# Patient Record
Sex: FEMALE | Race: OTHER | HISPANIC OR LATINO | Employment: UNEMPLOYED | URBAN - METROPOLITAN AREA
[De-identification: names, ages, dates, MRNs, and addresses within clinical notes are randomized per-mention and may not be internally consistent; named-entity substitution may affect disease eponyms.]

---

## 2022-01-24 ENCOUNTER — APPOINTMENT (OUTPATIENT)
Dept: URGENT CARE | Age: 32
End: 2022-01-24

## 2022-01-24 PROCEDURE — U0005 INFEC AGEN DETEC AMPLI PROBE: HCPCS

## 2022-01-24 PROCEDURE — U0003 INFECTIOUS AGENT DETECTION BY NUCLEIC ACID (DNA OR RNA); SEVERE ACUTE RESPIRATORY SYNDROME CORONAVIRUS 2 (SARS-COV-2) (CORONAVIRUS DISEASE [COVID-19]), AMPLIFIED PROBE TECHNIQUE, MAKING USE OF HIGH THROUGHPUT TECHNOLOGIES AS DESCRIBED BY CMS-2020-01-R: HCPCS

## 2022-01-25 ENCOUNTER — TELEPHONE (OUTPATIENT)
Dept: URGENT CARE | Age: 32
End: 2022-01-25

## 2022-01-25 NOTE — TELEPHONE ENCOUNTER
Patient was telephoned and told her results for her recent COVID testing, which she was negative for

## 2023-07-14 ENCOUNTER — TELEPHONE (OUTPATIENT)
Dept: GASTROENTEROLOGY | Facility: CLINIC | Age: 33
End: 2023-07-14

## 2023-07-14 ENCOUNTER — OFFICE VISIT (OUTPATIENT)
Dept: GASTROENTEROLOGY | Facility: CLINIC | Age: 33
End: 2023-07-14
Payer: COMMERCIAL

## 2023-07-14 VITALS
TEMPERATURE: 98.5 F | HEIGHT: 60 IN | WEIGHT: 222.6 LBS | SYSTOLIC BLOOD PRESSURE: 118 MMHG | DIASTOLIC BLOOD PRESSURE: 64 MMHG | BODY MASS INDEX: 43.7 KG/M2

## 2023-07-14 DIAGNOSIS — K21.9 GASTROESOPHAGEAL REFLUX DISEASE, UNSPECIFIED WHETHER ESOPHAGITIS PRESENT: ICD-10-CM

## 2023-07-14 DIAGNOSIS — R10.84 GENERALIZED ABDOMINAL PAIN: Primary | ICD-10-CM

## 2023-07-14 DIAGNOSIS — R68.81 EARLY SATIETY: ICD-10-CM

## 2023-07-14 DIAGNOSIS — R19.8 PAIN ASSOCIATED WITH DEFECATION: ICD-10-CM

## 2023-07-14 DIAGNOSIS — R19.5 ABNORMAL STOOLS: ICD-10-CM

## 2023-07-14 DIAGNOSIS — R14.0 BLOATING: ICD-10-CM

## 2023-07-14 PROCEDURE — 99244 OFF/OP CNSLTJ NEW/EST MOD 40: CPT | Performed by: INTERNAL MEDICINE

## 2023-07-14 RX ORDER — MONTELUKAST SODIUM 10 MG/1
10 TABLET ORAL
COMMUNITY
Start: 2022-09-15 | End: 2023-09-15

## 2023-07-14 RX ORDER — FLUTICASONE PROPIONATE 50 MCG
2 SPRAY, SUSPENSION (ML) NASAL DAILY
COMMUNITY
Start: 2023-02-01

## 2023-07-14 RX ORDER — BISACODYL 5 MG/1
20 TABLET, DELAYED RELEASE ORAL ONCE
Qty: 4 TABLET | Refills: 0 | Status: SHIPPED | OUTPATIENT
Start: 2023-07-14 | End: 2023-07-14

## 2023-07-14 RX ORDER — LEVOTHYROXINE SODIUM 0.15 MG/1
150 TABLET ORAL DAILY
COMMUNITY
Start: 2023-03-20

## 2023-07-14 NOTE — PROGRESS NOTES
[facility] - Outpatient Consultation  Marcie Castro 29 y.o. female MRN: [id number]  Encounter: [id number]          ASSESSMENT AND PLAN:      1. Generalized abdominal pain  2. Bloating  3. Heartburn  4. Early satiety  5. Abnormal stools  6. Pain on defecation  7. Marijuana use  8. History of bariatric surgery    Differentials include peptic ulcer disease, H. pylori, celiac disease, GI infection, gallstone disease, SIBO, IBS, IBD, pancreatic insufficiency, symptoms secondary to marijuana use and/or uncontrolled hypothyroidism. Discussed with patient about getting EGD, colonoscopy, CT abdomen, right upper quadrant ultrasound, stool test, CRP, celiac serology. Further management plan based on investigation results. Patient agreeable. Investigations ordered. RTC 4 months. Diane Lacey MD  Gastroenterology  [facility]  Date: July 14, 2023    - US right upper quadrant; Future  - CT abdomen pelvis w contrast; Future  - Calprotectin,Fecal; Future  - C-reactive protein; Future  - Giardia Ag, EIA, Stool (3 Specimens); Future  - Ova and parasite examination; Future  - Pancreatic elastase, fecal; Future  - Tissue transglutaminase, IgA; Future  - IgA; Future  - bisacodyl (DULCOLAX) 5 mg EC tablet; Take 4 tablets (20 mg total) by mouth once for 1 dose Colonoscopy prep  Dispense: 4 tablet; Refill: 0  - polyethylene glycol (GOLYTELY) 4000 mL solution; Take 4,000 mL by mouth once for 1 dose Colonoscopy prep  Dispense: 4000 mL; Refill: 0  - Calprotectin,Fecal  - C-reactive protein  - Giardia Ag, EIA, Stool (3 Specimens)  - Ova and parasite examination  - Pancreatic elastase, fecal  - Tissue transglutaminase, IgA  - IgA  - EGD; Future  - Colonoscopy;  Future      ______________________________________________________________________    HPI: 72-year-old with history of thyroid cancer status post thyroidectomy complicated by postop hypothyroidism, gastric sleeve in 2018, abdominal wall hernia repair presents for evaluation. Patient reports daily heartburn symptoms along with abdominal pain and bloating for the last few months. She has had loose bowel movements on most days in the month in the last few months as well. She notices pain on defecation sometimes. No blood in stools. She has had weight gain of 42 pounds over the last few months. She has early fullness with meal intake. No family history of colon cancer. No NSAID intake. She smokes marijuana. Occasional alcohol intake. No cigarette smoking. Labs done in 2022 showed normal renal function, creatinine and blood counts. Last TSH done in March 2023 was reviewed by me today and showed very high levels at 183. REVIEW OF SYSTEMS:    CONSTITUTIONAL: Denies any fever, chills, rigors, and weight loss. HEENT: No earache or tinnitus. Denies hearing loss or visual disturbances. CARDIOVASCULAR: No chest pain or palpitations. RESPIRATORY: Denies any cough, hemoptysis, shortness of breath or dyspnea on exertion. GASTROINTESTINAL: As noted in the History of Present Illness. GENITOURINARY: No problems with urination. Denies any hematuria or dysuria. NEUROLOGIC: No dizziness or vertigo, denies headaches. MUSCULOSKELETAL: Denies any muscle or joint pain. SKIN: Denies skin rashes or itching. ENDOCRINE: Denies excessive thirst. Denies intolerance to heat or cold. PSYCHOSOCIAL: Denies depression or anxiety. Denies any recent memory loss. Historical Information   History reviewed. No pertinent past medical history.   Past Surgical History:   Procedure Laterality Date   • HERNIA REPAIR  2015   • LAPAROSCOPIC GASTRIC BYPASS  2017     Social History   Social History     Substance and Sexual Activity   Alcohol Use Yes     Social History     Substance and Sexual Activity   Drug Use Yes   • Types: Marijuana     Social History     Tobacco Use   Smoking Status Never   Smokeless Tobacco Never     History reviewed. No pertinent family history. Meds/Allergies       Current Outpatient Medications:   •  bisacodyl (DULCOLAX) 5 mg EC tablet  •  fluticasone (FLONASE) 50 mcg/act nasal spray  •  levothyroxine 150 mcg tablet  •  montelukast (SINGULAIR) 10 mg tablet  •  polyethylene glycol (GOLYTELY) 4000 mL solution    No Known Allergies        Objective     Blood pressure 118/64, temperature 98.5 °F (36.9 °C), temperature source Tympanic, height 5' (1.524 m), weight 101 kg (222 lb 9.6 oz). Body mass index is 43.47 kg/m². PHYSICAL EXAM:      General Appearance:   Alert, cooperative, no distress   HEENT:   Normocephalic, atraumatic, anicteric.     Neck:  Supple, symmetrical, trachea midline   Lungs:   Clear to auscultation bilaterally; no rales, rhonchi or wheezing; respirations unlabored    Heart[de-identified]   Regular rate and rhythm; no murmur, rub, or gallop. Abdomen:   Soft, non-tender, non-distended; normal bowel sounds; no masses, no organomegaly    Genitalia:   Deferred    Rectal:   Deferred    Extremities:  No cyanosis, clubbing or edema    Pulses:  2+ and symmetric    Skin:  No jaundice, rashes, or lesions    Lymph nodes:  No palpable cervical lymphadenopathy        Lab Results:   No visits with results within 1 Day(s) from this visit. Latest known visit with results is:   Orders Only on 01/24/2022   Component Date Value   • SARS-CoV-2 01/24/2022 Negative          Radiology Results:   No results found.

## 2023-07-14 NOTE — PATIENT INSTRUCTIONS
Scheduled date of EGD/colonoscopy (as of today):08.09.23  Physician performing EGD/colonoscopy:DR MUSE  [facility] of [facility]colonoscopy[facility]  Desired bowel prep reviewed with patient:REGINALD  Instructions reviewed with patient by:MITALI  Clearances:   N/A    Pt will call to schedule u/s and ct.   Barium given in office

## 2023-07-25 ENCOUNTER — HOSPITAL ENCOUNTER (OUTPATIENT)
Dept: CT IMAGING | Facility: HOSPITAL | Age: 33
Discharge: HOME | End: 2023-07-25
Attending: INTERNAL MEDICINE
Payer: COMMERCIAL

## 2023-07-25 DIAGNOSIS — R10.84 GENERALIZED ABDOMINAL PAIN: ICD-10-CM

## 2023-07-25 DIAGNOSIS — R14.0 BLOATING: ICD-10-CM

## 2023-07-25 DIAGNOSIS — K21.9 GASTROESOPHAGEAL REFLUX DISEASE, UNSPECIFIED WHETHER ESOPHAGITIS PRESENT: ICD-10-CM

## 2023-07-25 DIAGNOSIS — R19.5 ABNORMAL STOOLS: ICD-10-CM

## 2023-07-25 DIAGNOSIS — R19.8 PAIN ASSOCIATED WITH DEFECATION: ICD-10-CM

## 2023-07-25 DIAGNOSIS — R68.81 EARLY SATIETY: ICD-10-CM

## 2023-07-25 PROCEDURE — G1004 CDSM NDSC: HCPCS

## 2023-07-25 PROCEDURE — 74177 CT ABD & PELVIS W/CONTRAST: CPT

## 2023-07-25 RX ADMIN — IOHEXOL 100 ML: 350 INJECTION, SOLUTION INTRAVENOUS at 09:16

## 2023-07-31 ENCOUNTER — TELEPHONE (OUTPATIENT)
Age: 33
End: 2023-07-31

## 2023-07-31 NOTE — TELEPHONE ENCOUNTER
Patients GI provider:  Dr. Wilber Pelletier     Number to return call: [phone]    Reason for call: Tony Ou from Radiology called stated there are significant findings ready in epic of [address] from 7-    Tigwalter texted        Scheduled procedure/appointment date if applicable: Apt/procedure

## 2023-08-08 RX ORDER — SODIUM CHLORIDE 9 MG/ML
125 INJECTION, SOLUTION INTRAVENOUS CONTINUOUS
OUTPATIENT
Start: 2023-08-08

## 2023-11-02 ENCOUNTER — TELEPHONE (OUTPATIENT)
Dept: GASTROENTEROLOGY | Facility: CLINIC | Age: 33
End: 2023-11-02

## 2023-11-07 NOTE — TELEPHONE ENCOUNTER
Pt called in an appt was yue for 11/10 colonoscopy was never done will need to yue pt is having stomach pain

## 2023-12-01 ENCOUNTER — OFFICE VISIT (OUTPATIENT)
Dept: GASTROENTEROLOGY | Facility: CLINIC | Age: 33
End: 2023-12-01
Payer: COMMERCIAL

## 2023-12-01 VITALS
BODY MASS INDEX: 45.67 KG/M2 | SYSTOLIC BLOOD PRESSURE: 126 MMHG | HEIGHT: 60 IN | TEMPERATURE: 98.3 F | DIASTOLIC BLOOD PRESSURE: 60 MMHG | WEIGHT: 232.6 LBS

## 2023-12-01 DIAGNOSIS — K21.9 GASTROESOPHAGEAL REFLUX DISEASE, UNSPECIFIED WHETHER ESOPHAGITIS PRESENT: Primary | ICD-10-CM

## 2023-12-01 DIAGNOSIS — R68.81 EARLY SATIETY: ICD-10-CM

## 2023-12-01 DIAGNOSIS — R10.84 GENERALIZED ABDOMINAL PAIN: ICD-10-CM

## 2023-12-01 DIAGNOSIS — K60.2 ANAL FISSURE: ICD-10-CM

## 2023-12-01 DIAGNOSIS — K21.9 GASTROESOPHAGEAL REFLUX DISEASE, UNSPECIFIED WHETHER ESOPHAGITIS PRESENT: ICD-10-CM

## 2023-12-01 DIAGNOSIS — Z00.00 PREVENTATIVE HEALTH CARE: ICD-10-CM

## 2023-12-01 PROCEDURE — 99214 OFFICE O/P EST MOD 30 MIN: CPT | Performed by: INTERNAL MEDICINE

## 2023-12-01 RX ORDER — OMEPRAZOLE 40 MG/1
40 CAPSULE, DELAYED RELEASE ORAL DAILY
Qty: 30 CAPSULE | Refills: 5 | Status: SHIPPED | OUTPATIENT
Start: 2023-12-01 | End: 2023-12-01

## 2023-12-01 RX ORDER — OMEPRAZOLE 40 MG/1
40 CAPSULE, DELAYED RELEASE ORAL DAILY
Qty: 90 CAPSULE | Refills: 1 | Status: SHIPPED | OUTPATIENT
Start: 2023-12-01

## 2023-12-01 RX ORDER — ALBUTEROL SULFATE 2.5 MG/3ML
2.5 SOLUTION RESPIRATORY (INHALATION) EVERY 4 HOURS PRN
COMMUNITY
Start: 2023-08-01 | End: 2024-07-31

## 2023-12-01 NOTE — PROGRESS NOTES
[facility] - Outpatient Follow-up Note  Christen Fleming 34 y.o. female MRN: [id number]  Encounter: [id number]          ASSESSMENT AND PLAN:      1. Gastroesophageal reflux disease, unspecified whether esophagitis present  2. Abdominal pain  3. Bloating  4. Loose stools  5. Hematochezia  6. Pain on defecation  7. Preventative health care    Patient has uncontrolled heartburn. Will start patient on omeprazole. May help with abdominal pain and bloating as well as ulcer disease/gastritis/H. pylori infection can cause the symptoms as well which can be somewhat treated with omeprazole. Since she could not tolerate prep last time, we will plan on doing EGD first to evaluate and then do colonoscopy at a later date. Her symptoms of pain with defecation are consistent with presence of anal fissure. Will start patient on nitroglycerin. Discussed with her the method of applying it and to lay down for 5 minutes after application of nitroglycerin. If she has excessive headaches or dizziness after applying nitroglycerin then she should contact me and we will discuss alternate medication. Will plan to investigate blood in stool and pain on defecation with colonoscopy at a later date. Will check hepatitis B and C screening tests. Requested patient to get stool labs and blood work for her GI symptoms at her earliest convenience. RTC 4 months. Lucas Horner MD  Gastroenterology  [facility]  Date: December 1, 2023    - omeprazole (PriLOSEC) 40 MG capsule; Take 1 capsule (40 mg total) by mouth daily  Dispense: 30 capsule; Refill: 5  - Hepatitis C antibody; Future  - Hepatitis B surface antibody; Future  - Hepatitis B surface antigen;  Future  - Hepatitis B core antibody, total; Future      ______________________________________________________________________    SUBJECTIVE: 79-year-old with history of thyroidectomy complicated by postop hypothyroidism, status post gastric sleeve presents for follow-up. During last visit patient reported abdominal pain with bloating, heartburn, early fullness, loose stools and pain with defecation. Plan was made for EGD, colonoscopy but patient could not get it done due to scheduling issues. Stool labs and blood work were ordered but not done yet. Patient did get a CT scan which showed left ovarian cyst for which ultrasound follow-up was recommended. Patient returns to office today. She reported that she could not tolerate prep last time and vomited it up. Usually she does not get nausea or vomiting. She does have heartburn. She continues to have abdominal pain, early fullness. She has 2 loose stools per day. She continues to have pain with defecation which continues for a few days. It is affecting her quality of life. REVIEW OF SYSTEMS IS OTHERWISE NEGATIVE. Historical Information   History reviewed. No pertinent past medical history. Past Surgical History:   Procedure Laterality Date    HERNIA REPAIR  2015    LAPAROSCOPIC GASTRIC BYPASS  2017     Social History   Social History     Substance and Sexual Activity   Alcohol Use Yes     Social History     Substance and Sexual Activity   Drug Use Yes    Types: Marijuana     Social History     Tobacco Use   Smoking Status Never   Smokeless Tobacco Never     History reviewed. No pertinent family history. Meds/Allergies       Current Outpatient Medications:     albuterol (2.5 mg/3 mL) 0.083 % nebulizer solution    fluticasone (FLONASE) 50 mcg/act nasal spray    levothyroxine 150 mcg tablet    omeprazole (PriLOSEC) 40 MG capsule    bisacodyl (DULCOLAX) 5 mg EC tablet    montelukast (SINGULAIR) 10 mg tablet    polyethylene glycol (GOLYTELY) 4000 mL solution    No Known Allergies        Objective     Blood pressure 126/60, temperature 98.3 °F (36.8 °C), height 5' (1.524 m), weight 106 kg (232 lb 9.6 oz). Body mass index is 45.43 kg/m².       PHYSICAL EXAM:      General Appearance:   Alert, cooperative, no distress   HEENT:   Normocephalic, atraumatic, anicteric. Neck:  Supple, symmetrical, trachea midline   Lungs:   Clear to auscultation bilaterally; no rales, rhonchi or wheezing; respirations unlabored    Heart[de-identified]   Regular rate and rhythm; no murmur, rub, or gallop. Abdomen:   Soft, non-tender, non-distended; normal bowel sounds; no masses, no organomegaly    Genitalia:   Deferred    Rectal:   Deferred    Extremities:  No cyanosis, clubbing or edema    Pulses:  2+ and symmetric    Skin:  No jaundice, rashes, or lesions    Lymph nodes:  No palpable cervical lymphadenopathy        Lab Results:   No visits with results within 1 Day(s) from this visit. Latest known visit with results is:   Orders Only on 01/24/2022   Component Date Value    SARS-CoV-2 01/24/2022 Negative          Radiology Results:   No results found.

## 2023-12-01 NOTE — PATIENT INSTRUCTIONS
Scheduled date of EGD(as of today):12/15/2023  Physician performing EGD:Dr Reyna  [facility] of [facility]  Instructions reviewed with patient by:juan luis  Clearances:  n/a

## 2023-12-06 LAB — HCV AB SER-ACNC: NONREACTIVE

## 2023-12-07 ENCOUNTER — TELEPHONE (OUTPATIENT)
Dept: GASTROENTEROLOGY | Facility: CLINIC | Age: 33
End: 2023-12-07

## 2023-12-14 RX ORDER — SODIUM CHLORIDE, SODIUM LACTATE, POTASSIUM CHLORIDE, CALCIUM CHLORIDE 600; 310; 30; 20 MG/100ML; MG/100ML; MG/100ML; MG/100ML
100 INJECTION, SOLUTION INTRAVENOUS CONTINUOUS
OUTPATIENT
Start: 2023-12-14

## 2023-12-15 ENCOUNTER — HOSPITAL ENCOUNTER (OUTPATIENT)
Dept: GASTROENTEROLOGY | Facility: HOSPITAL | Age: 33
Setting detail: OUTPATIENT SURGERY
Discharge: HOME | End: 2023-12-15
Attending: INTERNAL MEDICINE

## 2023-12-15 ENCOUNTER — TELEPHONE (OUTPATIENT)
Dept: GASTROENTEROLOGY | Facility: CLINIC | Age: 33
End: 2023-12-15

## 2023-12-15 DIAGNOSIS — K21.9 GASTROESOPHAGEAL REFLUX DISEASE, UNSPECIFIED WHETHER ESOPHAGITIS PRESENT: ICD-10-CM

## 2023-12-15 DIAGNOSIS — R68.81 EARLY SATIETY: ICD-10-CM

## 2023-12-15 DIAGNOSIS — R10.84 GENERALIZED ABDOMINAL PAIN: ICD-10-CM

## 2023-12-15 NOTE — H&P
History and Physical - [facility]  Li Vides 33 y.o. female MRN: [id number]                  HPI: Li Vides is a 33 y.o. year old female who presents for EGD to investigate her Lopez's screening, abdominal pain bloating and loose stools.  Patient could not stay for procedure due to transportation issues.  I will message staff to reschedule procedure.      REVIEW OF SYSTEMS: Per the HPI, and otherwise unremarkable.    Historical Information   No past medical history on file.  Past Surgical History:   Procedure Laterality Date    HERNIA REPAIR  2015    LAPAROSCOPIC GASTRIC BYPASS  2017     Social History   Social History     Substance and Sexual Activity   Alcohol Use Yes     Social History     Substance and Sexual Activity   Drug Use Yes    Types: Marijuana     Social History     Tobacco Use   Smoking Status Never   Smokeless Tobacco Never     No family history on file.    Meds/Allergies     (Not in a hospital admission)      No Known Allergies    Objective     There were no vitals taken for this visit.      PHYSICAL EXAM    Gen: NAD  CV: RRR  CHEST: Clear  ABD: soft, NT/ND  EXT: no edema      ASSESSMENT/PLAN:  Li Vides is a 33 y.o. year old female who presents for EGD to investigate her Lopez's screening, abdominal pain bloating and loose stools. Patient could not stay for procedure due to transportation issues.  I will message staff to reschedule procedure.

## 2023-12-15 NOTE — TELEPHONE ENCOUNTER
----- Message from Joycelyn Duncan sent at 12/15/2023 12:01 PM EST -----    ----- Message -----  From: Nicolas Robertson MD  Sent: 12/15/2023  11:59 AM EST  To: #    Patient could not get procedure done today due to transportation issues. Please kindly contact her and reschedule procedure with me.

## 2023-12-18 ENCOUNTER — TELEPHONE (OUTPATIENT)
Dept: GASTROENTEROLOGY | Facility: CLINIC | Age: 33
End: 2023-12-18

## 2023-12-18 NOTE — TELEPHONE ENCOUNTER
I left a voice message to inform the patient of her results and provided the office number with any further questions or concerns, Thank you

## 2023-12-28 ENCOUNTER — TRANSCRIBE ORDERS (OUTPATIENT)
Dept: GASTROENTEROLOGY | Facility: CLINIC | Age: 33
End: 2023-12-28

## 2023-12-28 ENCOUNTER — TELEPHONE (OUTPATIENT)
Dept: GASTROENTEROLOGY | Facility: CLINIC | Age: 33
End: 2023-12-28

## 2023-12-28 ENCOUNTER — PREP FOR PROCEDURE (OUTPATIENT)
Dept: GASTROENTEROLOGY | Facility: CLINIC | Age: 33
End: 2023-12-28

## 2023-12-28 DIAGNOSIS — R68.81 EARLY SATIETY: ICD-10-CM

## 2023-12-28 DIAGNOSIS — K21.9 GASTROESOPHAGEAL REFLUX DISEASE, UNSPECIFIED WHETHER ESOPHAGITIS PRESENT: Primary | ICD-10-CM

## 2023-12-28 DIAGNOSIS — R10.84 GENERALIZED ABDOMINAL PAIN: ICD-10-CM

## 2023-12-28 NOTE — TELEPHONE ENCOUNTER
From: Christina Ibrahim RN   Sent: 12/15/2023   1:50 PM EST   To: *     Good Afternoon,     Please call this patient and reschedule EGD. Patient did not have a ride after 1230 so she left before being admitted. There was a delay in the schedule.    Please make first patient of the day when rescheduling and karen do not move.       Thank you   Christina

## 2023-12-28 NOTE — TELEPHONE ENCOUNTER
Scheduled date of EGD(as of today): 02/13/2024  Physician performing EGD: Dr. Reyna   Location of EGD:   Instructions reviewed with patient by: Emy (phone)  Clearances: N/A

## 2024-01-29 ENCOUNTER — TELEPHONE (OUTPATIENT)
Dept: GASTROENTEROLOGY | Facility: CLINIC | Age: 34
End: 2024-01-29

## 2024-02-08 ENCOUNTER — TELEPHONE (OUTPATIENT)
Dept: GASTROENTEROLOGY | Facility: CLINIC | Age: 34
End: 2024-02-08

## 2024-02-08 NOTE — TELEPHONE ENCOUNTER
----- Message from Mary Chaudhari sent at 2/8/2024 11:26 AM EST -----  Regarding: cancel 2/13/2024 appt  Good morning!  Please cancel Li's appt for 2/13/24 at [place] with Dr Reyna for EGD. She is having insurance issues and will call back when she has it straightened out. Thank you!!

## 2024-03-06 ENCOUNTER — TELEPHONE (OUTPATIENT)
Dept: GASTROENTEROLOGY | Facility: CLINIC | Age: 34
End: 2024-03-06

## 2024-03-06 NOTE — TELEPHONE ENCOUNTER
Called patient to confirm if patient has insurance. Patient stated she did have insurance at the time and wanted to cancel appt. Patient will call back to reschedule when ready.

## 2024-03-25 ENCOUNTER — TELEPHONE (OUTPATIENT)
Age: 34
End: 2024-03-25

## 2024-03-25 ENCOUNTER — NURSE TRIAGE (OUTPATIENT)
Age: 34
End: 2024-03-25

## 2024-03-25 NOTE — TELEPHONE ENCOUNTER
Scheduled date of EGD/colonoscopy (as of today):4/4/2024  Physician performing EGD/colonoscopy:GUS  Location of EGD/colonoscopy:  Desired bowel prep reviewed with patient:M&D  Instructions reviewed with patient by:KARRIE  Clearances:  NONE

## 2024-03-25 NOTE — TELEPHONE ENCOUNTER
Last ov 12/1/23 Dr. Reyna,     [facility]:  Patient unsure if she has hemorrhoid or not. She is noting pain rectum and states it feels like something is swollen. She does not have any issues with bowel movements, no blood noted just moderate pain. She has tried OTC remedies without success. She states the pain is keeping her up at night. Patient scheduled for visit this afternoon to be examined. Patient has to arrange childcare and if she cannot she will call to reschedule.      Answer Questions - Hemorrhoids  When did your symptoms start:  a few days ago    Is there bright red blood when wiping or streaks in the stool: no    How many occurrences have you had in the last 24 hours: constant pain rectum, feels swelling    Have your symptoms: no relief with OTC products    Do you feel this is a mild, moderate ,or severe amount of blood:  no blood noted    Are you experiencing more diarrhea or constipation:  no issue with bowel movements    Do you have anything prescribed or over the counter for this: tried OTC meds without any relief      Are you experiencing any additional symptoms such as: No, just rectal pain    Protocols used: Hemorrhoid

## 2024-03-25 NOTE — TELEPHONE ENCOUNTER
Spoke w/Tammy at [facility]. Cancelled pt's apt, called pt to resched w/colorectal. OV has been sched for 03/28/2024 w/Dr. Steinberg.

## 2024-03-25 NOTE — TELEPHONE ENCOUNTER
Regarding: hemroids  ----- Message from Dc Cortes MA sent at 3/25/2024  1:01 PM EDT -----  Pt calling reports Hemorid are swollen and the pain has been on going for 2 days.

## 2024-04-01 ENCOUNTER — TELEPHONE (OUTPATIENT)
Age: 34
End: 2024-04-01

## 2024-04-01 NOTE — TELEPHONE ENCOUNTER
Patient returned call. Called ctr and transferred over patient to [facility] to further assist.

## 2024-04-01 NOTE — TELEPHONE ENCOUNTER
Patients GI provider:  Dr. Reyna    Number to return call: [phone]    Reason for call: Pt calling to ask why her 4/4 Colon/EGD was cxld, her Insurance has been reinstated.  Patient was not scheduled for 4/4 ( Error)  Also I see she was getting an EGD but the Colonoscopy was added which was ordered 07/2023.   Please return call to patient to schedule correct procedure at correct location.  She states she has symptoms and wants procedures asap.    Scheduled procedure/appointment date if applicable: NA

## 2024-04-01 NOTE — TELEPHONE ENCOUNTER
Pt called back stating she just spoke w/ someone who scheduled per procedures EGD/Colon at [facility] location. Yadi's note did not show when pt called right back. I was trying to help pt and call the office and was talking w/ Elena and pt hung up.

## 2024-04-01 NOTE — TELEPHONE ENCOUNTER
Pt called back in and she is now scheduled for 4/22 for colon/egd. Pt. Is upset because she stated that she was told she could do 4/3 or 4/4 but that date was no longer available when she called back in.

## 2024-04-01 NOTE — TELEPHONE ENCOUNTER
Pt. Calling because colonoscopy got cancelled du to insurance, pt would like to update insurance and re-schedule colonoscopy, transferred to scheduling

## 2024-05-03 ENCOUNTER — TELEPHONE (OUTPATIENT)
Dept: GASTROENTEROLOGY | Facility: CLINIC | Age: 34
End: 2024-05-03